# Patient Record
Sex: FEMALE | Race: BLACK OR AFRICAN AMERICAN | NOT HISPANIC OR LATINO | URBAN - METROPOLITAN AREA
[De-identification: names, ages, dates, MRNs, and addresses within clinical notes are randomized per-mention and may not be internally consistent; named-entity substitution may affect disease eponyms.]

---

## 2023-03-27 ENCOUNTER — APPOINTMENT (RX ONLY)
Dept: URBAN - METROPOLITAN AREA CLINIC 71 | Facility: CLINIC | Age: 68
Setting detail: DERMATOLOGY
End: 2023-03-27

## 2023-03-27 DIAGNOSIS — D485 NEOPLASM OF UNCERTAIN BEHAVIOR OF SKIN: ICD-10-CM

## 2023-03-27 DIAGNOSIS — L81.4 OTHER MELANIN HYPERPIGMENTATION: ICD-10-CM

## 2023-03-27 DIAGNOSIS — Z71.89 OTHER SPECIFIED COUNSELING: ICD-10-CM

## 2023-03-27 DIAGNOSIS — L65.9 NONSCARRING HAIR LOSS, UNSPECIFIED: ICD-10-CM

## 2023-03-27 DIAGNOSIS — L82.1 OTHER SEBORRHEIC KERATOSIS: ICD-10-CM

## 2023-03-27 PROBLEM — D48.5 NEOPLASM OF UNCERTAIN BEHAVIOR OF SKIN: Status: ACTIVE | Noted: 2023-03-27

## 2023-03-27 PROCEDURE — 11104 PUNCH BX SKIN SINGLE LESION: CPT

## 2023-03-27 PROCEDURE — ? TREATMENT REGIMEN

## 2023-03-27 PROCEDURE — ? COUNSELING

## 2023-03-27 PROCEDURE — ? ORDER TESTS

## 2023-03-27 PROCEDURE — ? BIOPSY BY PUNCH METHOD

## 2023-03-27 PROCEDURE — 99203 OFFICE O/P NEW LOW 30 MIN: CPT | Mod: 25

## 2023-03-27 ASSESSMENT — LOCATION ZONE DERM
LOCATION ZONE: FACE
LOCATION ZONE: SCALP

## 2023-03-27 ASSESSMENT — LOCATION SIMPLE DESCRIPTION DERM
LOCATION SIMPLE: RIGHT FOREHEAD
LOCATION SIMPLE: LEFT SCALP
LOCATION SIMPLE: RIGHT CHEEK

## 2023-03-27 ASSESSMENT — LOCATION DETAILED DESCRIPTION DERM
LOCATION DETAILED: RIGHT CENTRAL MALAR CHEEK
LOCATION DETAILED: RIGHT SUPERIOR LATERAL FOREHEAD
LOCATION DETAILED: LEFT CENTRAL FRONTAL SCALP
LOCATION DETAILED: LEFT MEDIAL FRONTAL SCALP

## 2023-03-27 NOTE — PROCEDURE: TREATMENT REGIMEN
Plan: Denies pain, itching or burning of scalp. No scale present. PT states she had surgery a little over a year prior to hair loss. Still considering this a working diagnosis and may opt for biopsy pending lab results.
Detail Level: Zone
Initiate Treatment: Rogaine 5% nightly before bed

## 2023-04-03 ENCOUNTER — APPOINTMENT (RX ONLY)
Dept: URBAN - METROPOLITAN AREA CLINIC 71 | Facility: CLINIC | Age: 68
Setting detail: DERMATOLOGY
End: 2023-04-03

## 2023-04-03 DIAGNOSIS — Z71.89 OTHER SPECIFIED COUNSELING: ICD-10-CM

## 2023-04-03 DIAGNOSIS — L65.9 NONSCARRING HAIR LOSS, UNSPECIFIED: ICD-10-CM

## 2023-04-03 DIAGNOSIS — D22 MELANOCYTIC NEVI: ICD-10-CM

## 2023-04-03 PROBLEM — D22.39 MELANOCYTIC NEVI OF OTHER PARTS OF FACE: Status: ACTIVE | Noted: 2023-04-03

## 2023-04-03 PROCEDURE — 99212 OFFICE O/P EST SF 10 MIN: CPT | Mod: 25

## 2023-04-03 PROCEDURE — ? COUNSELING

## 2023-04-03 PROCEDURE — ? BIOPSY BY PUNCH METHOD

## 2023-04-03 PROCEDURE — ? SUTURE REMOVAL (NO GLOBAL PERIOD)

## 2023-04-03 PROCEDURE — 11104 PUNCH BX SKIN SINGLE LESION: CPT

## 2023-04-03 PROCEDURE — ? PATHOLOGY DISCUSSION

## 2023-04-03 ASSESSMENT — LOCATION SIMPLE DESCRIPTION DERM
LOCATION SIMPLE: LEFT SCALP
LOCATION SIMPLE: SCALP
LOCATION SIMPLE: RIGHT FOREHEAD

## 2023-04-03 ASSESSMENT — LOCATION ZONE DERM
LOCATION ZONE: SCALP
LOCATION ZONE: FACE

## 2023-04-03 ASSESSMENT — LOCATION DETAILED DESCRIPTION DERM
LOCATION DETAILED: LEFT MEDIAL FRONTAL SCALP
LOCATION DETAILED: RIGHT SUPERIOR LATERAL FOREHEAD
LOCATION DETAILED: LEFT SUPERIOR PARIETAL SCALP

## 2023-04-13 ENCOUNTER — APPOINTMENT (RX ONLY)
Dept: URBAN - METROPOLITAN AREA CLINIC 71 | Facility: CLINIC | Age: 68
Setting detail: DERMATOLOGY
End: 2023-04-13

## 2023-04-13 DIAGNOSIS — L64.8 OTHER ANDROGENIC ALOPECIA: ICD-10-CM

## 2023-04-13 DIAGNOSIS — Z71.89 OTHER SPECIFIED COUNSELING: ICD-10-CM

## 2023-04-13 PROCEDURE — ? SUTURE REMOVAL (NO GLOBAL PERIOD)

## 2023-04-13 PROCEDURE — ? COUNSELING

## 2023-04-13 PROCEDURE — 99213 OFFICE O/P EST LOW 20 MIN: CPT

## 2023-04-13 PROCEDURE — ? TREATMENT REGIMEN

## 2023-04-13 PROCEDURE — ? PATHOLOGY DISCUSSION

## 2023-04-13 ASSESSMENT — LOCATION ZONE DERM: LOCATION ZONE: SCALP

## 2023-04-13 ASSESSMENT — LOCATION DETAILED DESCRIPTION DERM: LOCATION DETAILED: LEFT SUPERIOR PARIETAL SCALP

## 2023-04-13 ASSESSMENT — LOCATION SIMPLE DESCRIPTION DERM: LOCATION SIMPLE: SCALP

## 2023-04-13 NOTE — PROCEDURE: TREATMENT REGIMEN
Initiate Treatment: Nutrafol supplements\\nRogaine 5% QD-BID
Plan: Can consider PRP injections in the future.\\nSpoke to patient about Spironolactone, advised to speak to cardiologist to get their opinion prior to starting.
Detail Level: Zone

## 2025-08-12 ENCOUNTER — APPOINTMENT (OUTPATIENT)
Dept: URBAN - METROPOLITAN AREA CLINIC 69 | Facility: CLINIC | Age: 70
Setting detail: DERMATOLOGY
End: 2025-08-12

## 2025-08-12 DIAGNOSIS — L259 CONTACT DERMATITIS AND OTHER ECZEMA, UNSPECIFIED CAUSE: ICD-10-CM | Status: INADEQUATELY CONTROLLED

## 2025-08-12 DIAGNOSIS — L91.8 OTHER HYPERTROPHIC DISORDERS OF THE SKIN: ICD-10-CM

## 2025-08-12 DIAGNOSIS — Z71.89 OTHER SPECIFIED COUNSELING: ICD-10-CM

## 2025-08-12 PROBLEM — L30.8 OTHER SPECIFIED DERMATITIS: Status: ACTIVE | Noted: 2025-08-12

## 2025-08-12 PROCEDURE — ? SKIN TAG REMOVAL

## 2025-08-12 PROCEDURE — ? PRESCRIPTION

## 2025-08-12 PROCEDURE — ? PRESCRIPTION MEDICATION MANAGEMENT

## 2025-08-12 PROCEDURE — ? COUNSELING

## 2025-08-12 RX ORDER — TRIAMCINOLONE ACETONIDE 1 MG/G
CREAM TOPICAL BID
Qty: 80 | Refills: 0 | Status: ERX | COMMUNITY
Start: 2025-08-12

## 2025-08-12 RX ADMIN — TRIAMCINOLONE ACETONIDE: 1 CREAM TOPICAL at 00:00

## 2025-08-12 ASSESSMENT — ITCH NUMERIC RATING SCALE: HOW SEVERE IS YOUR ITCHING?: 4

## 2025-08-12 ASSESSMENT — LOCATION SIMPLE DESCRIPTION DERM
LOCATION SIMPLE: RIGHT FOREARM
LOCATION SIMPLE: LEFT UPPER BACK
LOCATION SIMPLE: LEFT SHOULDER
LOCATION SIMPLE: RIGHT UPPER BACK

## 2025-08-12 ASSESSMENT — BSA RASH: BSA RASH: 1

## 2025-08-12 ASSESSMENT — LOCATION DETAILED DESCRIPTION DERM
LOCATION DETAILED: RIGHT PROXIMAL DORSAL FOREARM
LOCATION DETAILED: RIGHT LATERAL UPPER BACK
LOCATION DETAILED: LEFT POSTERIOR SHOULDER
LOCATION DETAILED: RIGHT MEDIAL UPPER BACK
LOCATION DETAILED: LEFT SUPERIOR MEDIAL UPPER BACK

## 2025-08-12 ASSESSMENT — LOCATION ZONE DERM
LOCATION ZONE: ARM
LOCATION ZONE: TRUNK